# Patient Record
Sex: MALE | Race: WHITE | NOT HISPANIC OR LATINO | URBAN - NONMETROPOLITAN AREA
[De-identification: names, ages, dates, MRNs, and addresses within clinical notes are randomized per-mention and may not be internally consistent; named-entity substitution may affect disease eponyms.]

---

## 2024-04-12 ENCOUNTER — APPOINTMENT (OUTPATIENT)
Dept: CT IMAGING | Facility: HOSPITAL | Age: 29
End: 2024-04-12
Payer: OTHER GOVERNMENT

## 2024-04-12 ENCOUNTER — APPOINTMENT (OUTPATIENT)
Dept: GENERAL RADIOLOGY | Facility: HOSPITAL | Age: 29
End: 2024-04-12
Payer: OTHER GOVERNMENT

## 2024-04-12 ENCOUNTER — HOSPITAL ENCOUNTER (EMERGENCY)
Facility: HOSPITAL | Age: 29
Discharge: HOME | End: 2024-04-12
Attending: EMERGENCY MEDICINE
Payer: OTHER GOVERNMENT

## 2024-04-12 VITALS
SYSTOLIC BLOOD PRESSURE: 116 MMHG | WEIGHT: 150 LBS | HEIGHT: 68 IN | RESPIRATION RATE: 16 BRPM | TEMPERATURE: 98.3 F | BODY MASS INDEX: 22.73 KG/M2 | DIASTOLIC BLOOD PRESSURE: 87 MMHG | HEART RATE: 74 BPM | OXYGEN SATURATION: 98 %

## 2024-04-12 DIAGNOSIS — H93.8X3 EAR PRESSURE, BILATERAL: ICD-10-CM

## 2024-04-12 DIAGNOSIS — R07.89 CHEST TIGHTNESS: Primary | ICD-10-CM

## 2024-04-12 LAB
ALBUMIN SERPL-MCNC: 4.5 G/DL (ref 3.5–5.2)
ALBUMIN/GLOB SERPL: 1.6 G/DL
ALP SERPL-CCNC: 78 U/L (ref 39–117)
ALT SERPL W P-5'-P-CCNC: 135 U/L (ref 1–41)
ANION GAP SERPL CALCULATED.3IONS-SCNC: 11 MMOL/L (ref 5–15)
AST SERPL-CCNC: 45 U/L (ref 1–40)
BASOPHILS # BLD AUTO: 0.06 10*3/MM3 (ref 0–0.2)
BASOPHILS NFR BLD AUTO: 0.5 % (ref 0–1.5)
BILIRUB SERPL-MCNC: 0.4 MG/DL (ref 0–1.2)
BILIRUB UR QL STRIP: NEGATIVE
BUN SERPL-MCNC: 19 MG/DL (ref 6–20)
BUN/CREAT SERPL: 24.1 (ref 7–25)
CALCIUM SPEC-SCNC: 9.9 MG/DL (ref 8.6–10.5)
CHLORIDE SERPL-SCNC: 96 MMOL/L (ref 98–107)
CLARITY UR: CLEAR
CO2 SERPL-SCNC: 31 MMOL/L (ref 22–29)
COLOR UR: YELLOW
CREAT SERPL-MCNC: 0.79 MG/DL (ref 0.76–1.27)
DEPRECATED RDW RBC AUTO: 41.6 FL (ref 37–54)
EGFRCR SERPLBLD CKD-EPI 2021: 124.9 ML/MIN/1.73
EOSINOPHIL # BLD AUTO: 0.39 10*3/MM3 (ref 0–0.4)
EOSINOPHIL NFR BLD AUTO: 2.9 % (ref 0.3–6.2)
ERYTHROCYTE [DISTWIDTH] IN BLOOD BY AUTOMATED COUNT: 13.6 % (ref 12.3–15.4)
FLUAV SUBTYP SPEC NAA+PROBE: NOT DETECTED
FLUBV RNA ISLT QL NAA+PROBE: NOT DETECTED
GLOBULIN UR ELPH-MCNC: 2.9 GM/DL
GLUCOSE SERPL-MCNC: 81 MG/DL (ref 65–99)
GLUCOSE UR STRIP-MCNC: NEGATIVE MG/DL
HCT VFR BLD AUTO: 45.1 % (ref 37.5–51)
HETEROPH AB SER QL LA: NEGATIVE
HGB BLD-MCNC: 15.5 G/DL (ref 13–17.7)
HGB UR QL STRIP.AUTO: NEGATIVE
HOLD SPECIMEN: NORMAL
HOLD SPECIMEN: NORMAL
IMM GRANULOCYTES # BLD AUTO: 0.48 10*3/MM3 (ref 0–0.05)
IMM GRANULOCYTES NFR BLD AUTO: 3.6 % (ref 0–0.5)
KETONES UR QL STRIP: NEGATIVE
LEUKOCYTE ESTERASE UR QL STRIP.AUTO: NEGATIVE
LIPASE SERPL-CCNC: 51 U/L (ref 13–60)
LYMPHOCYTES # BLD AUTO: 5.53 10*3/MM3 (ref 0.7–3.1)
LYMPHOCYTES NFR BLD AUTO: 41.6 % (ref 19.6–45.3)
MAGNESIUM SERPL-MCNC: 2.6 MG/DL (ref 1.6–2.6)
MCH RBC QN AUTO: 28.9 PG (ref 26.6–33)
MCHC RBC AUTO-ENTMCNC: 34.4 G/DL (ref 31.5–35.7)
MCV RBC AUTO: 84.1 FL (ref 79–97)
MONOCYTES # BLD AUTO: 1.28 10*3/MM3 (ref 0.1–0.9)
MONOCYTES NFR BLD AUTO: 9.6 % (ref 5–12)
NEUTROPHILS NFR BLD AUTO: 41.8 % (ref 42.7–76)
NEUTROPHILS NFR BLD AUTO: 5.54 10*3/MM3 (ref 1.7–7)
NITRITE UR QL STRIP: NEGATIVE
NRBC BLD AUTO-RTO: 0 /100 WBC (ref 0–0.2)
PH UR STRIP.AUTO: 7.5 [PH] (ref 5–8)
PLAT MORPH BLD: NORMAL
PLATELET # BLD AUTO: 293 10*3/MM3 (ref 140–450)
PMV BLD AUTO: 9.3 FL (ref 6–12)
POTASSIUM SERPL-SCNC: 4 MMOL/L (ref 3.5–5.2)
PROT SERPL-MCNC: 7.4 G/DL (ref 6–8.5)
PROT UR QL STRIP: NEGATIVE
RBC # BLD AUTO: 5.36 10*6/MM3 (ref 4.14–5.8)
RBC MORPH BLD: NORMAL
S PYO AG THROAT QL: NEGATIVE
SARS-COV-2 RNA RESP QL NAA+PROBE: NOT DETECTED
SODIUM SERPL-SCNC: 138 MMOL/L (ref 136–145)
SP GR UR STRIP: 1.01 (ref 1–1.03)
TROPONIN T SERPL HS-MCNC: <6 NG/L
UROBILINOGEN UR QL STRIP: NORMAL
WBC MORPH BLD: NORMAL
WBC NRBC COR # BLD AUTO: 13.28 10*3/MM3 (ref 3.4–10.8)
WHOLE BLOOD HOLD COAG: NORMAL
WHOLE BLOOD HOLD SPECIMEN: NORMAL

## 2024-04-12 PROCEDURE — 83690 ASSAY OF LIPASE: CPT | Performed by: PHYSICIAN ASSISTANT

## 2024-04-12 PROCEDURE — 25010000002 ONDANSETRON PER 1 MG: Performed by: PHYSICIAN ASSISTANT

## 2024-04-12 PROCEDURE — 85025 COMPLETE CBC W/AUTO DIFF WBC: CPT | Performed by: PHYSICIAN ASSISTANT

## 2024-04-12 PROCEDURE — 84484 ASSAY OF TROPONIN QUANT: CPT | Performed by: PHYSICIAN ASSISTANT

## 2024-04-12 PROCEDURE — 86308 HETEROPHILE ANTIBODY SCREEN: CPT | Performed by: PHYSICIAN ASSISTANT

## 2024-04-12 PROCEDURE — 71045 X-RAY EXAM CHEST 1 VIEW: CPT

## 2024-04-12 PROCEDURE — 87636 SARSCOV2 & INF A&B AMP PRB: CPT | Performed by: EMERGENCY MEDICINE

## 2024-04-12 PROCEDURE — 25010000002 KETOROLAC TROMETHAMINE PER 15 MG: Performed by: PHYSICIAN ASSISTANT

## 2024-04-12 PROCEDURE — 74177 CT ABD & PELVIS W/CONTRAST: CPT

## 2024-04-12 PROCEDURE — 96374 THER/PROPH/DIAG INJ IV PUSH: CPT

## 2024-04-12 PROCEDURE — 80053 COMPREHEN METABOLIC PANEL: CPT | Performed by: PHYSICIAN ASSISTANT

## 2024-04-12 PROCEDURE — 83735 ASSAY OF MAGNESIUM: CPT | Performed by: PHYSICIAN ASSISTANT

## 2024-04-12 PROCEDURE — 25510000001 IOPAMIDOL 61 % SOLUTION: Performed by: EMERGENCY MEDICINE

## 2024-04-12 PROCEDURE — 96375 TX/PRO/DX INJ NEW DRUG ADDON: CPT

## 2024-04-12 PROCEDURE — 71260 CT THORAX DX C+: CPT

## 2024-04-12 PROCEDURE — 99285 EMERGENCY DEPT VISIT HI MDM: CPT

## 2024-04-12 PROCEDURE — 87040 BLOOD CULTURE FOR BACTERIA: CPT | Performed by: EMERGENCY MEDICINE

## 2024-04-12 PROCEDURE — 93005 ELECTROCARDIOGRAM TRACING: CPT | Performed by: PHYSICIAN ASSISTANT

## 2024-04-12 PROCEDURE — 36415 COLL VENOUS BLD VENIPUNCTURE: CPT

## 2024-04-12 PROCEDURE — 85007 BL SMEAR W/DIFF WBC COUNT: CPT | Performed by: PHYSICIAN ASSISTANT

## 2024-04-12 PROCEDURE — 87081 CULTURE SCREEN ONLY: CPT | Performed by: PHYSICIAN ASSISTANT

## 2024-04-12 PROCEDURE — 81003 URINALYSIS AUTO W/O SCOPE: CPT | Performed by: PHYSICIAN ASSISTANT

## 2024-04-12 PROCEDURE — 87880 STREP A ASSAY W/OPTIC: CPT | Performed by: PHYSICIAN ASSISTANT

## 2024-04-12 RX ORDER — SODIUM CHLORIDE 0.9 % (FLUSH) 0.9 %
10 SYRINGE (ML) INJECTION AS NEEDED
Status: DISCONTINUED | OUTPATIENT
Start: 2024-04-12 | End: 2024-04-12 | Stop reason: HOSPADM

## 2024-04-12 RX ORDER — ONDANSETRON 2 MG/ML
4 INJECTION INTRAMUSCULAR; INTRAVENOUS ONCE
Status: COMPLETED | OUTPATIENT
Start: 2024-04-12 | End: 2024-04-12

## 2024-04-12 RX ORDER — FLUTICASONE PROPIONATE 50 MCG
2 SPRAY, SUSPENSION (ML) NASAL DAILY
Qty: 15.8 ML | Refills: 0 | Status: SHIPPED | OUTPATIENT
Start: 2024-04-12

## 2024-04-12 RX ORDER — KETOROLAC TROMETHAMINE 30 MG/ML
30 INJECTION, SOLUTION INTRAMUSCULAR; INTRAVENOUS ONCE
Status: COMPLETED | OUTPATIENT
Start: 2024-04-12 | End: 2024-04-12

## 2024-04-12 RX ADMIN — IOPAMIDOL 100 ML: 612 INJECTION, SOLUTION INTRAVENOUS at 13:35

## 2024-04-12 RX ADMIN — ONDANSETRON 4 MG: 2 INJECTION INTRAMUSCULAR; INTRAVENOUS at 11:44

## 2024-04-12 RX ADMIN — KETOROLAC TROMETHAMINE 30 MG: 30 INJECTION, SOLUTION INTRAMUSCULAR; INTRAVENOUS at 13:51

## 2024-04-12 NOTE — ED PROVIDER NOTES
Subjective  History of Present Illness:    Chief Complaint:   Chief Complaint   Patient presents with    Flu Symptoms     Pt states he was sick the other day, flu like with burning chest. Pot is feeling better, meds are finished. Pt is now having is having abd pressure, Nausea, hiram ear pain with possible chest pain.weird feeling in the mouth, unable to concentrate.        History of Present Illness: Graham Sanchez is a 28 y.o. male who presents to the emergency department complaining of multiple complaints.  He states over a week ago started with flulike symptoms was seen at urgent treatment and given steroids and antibiotics, has finished these therapies.  States he continues to feel poorly has pressure in his ears, feels, foggy headed, has a cough, complains of burning in his chest and pressure in his chest and his epigastrium.  Vomiting or diarrhea.  Onset: Over a week ago  Duration: Ongoing  Exacerbating / Alleviating factors: None  Associated symptoms: headache      Nurses Notes reviewed and agree, including vitals, allergies, social history and prior medical history.     Review of Systems   HENT:          Bilateral ear pressure   Cardiovascular:         Tightness in chest   Gastrointestinal:         Tenderness in epigastrium   Musculoskeletal:  Negative for neck pain.   Neurological:  Negative for headaches.        Foggy headedness       Past Medical History:   Diagnosis Date    Back pain     Bipolar 1 disorder     Hypertension     Injury of back     from  service    Sciatica        Allergies:    Patient has no known allergies.      Past Surgical History:   Procedure Laterality Date    DENTAL PROCEDURE      SHOULDER SURGERY Left     TONSILLECTOMY           Social History     Socioeconomic History    Marital status:    Tobacco Use    Smoking status: Never    Smokeless tobacco: Never   Vaping Use    Vaping status: Never Used   Substance and Sexual Activity    Alcohol use: Yes     Comment: rarely  "   Drug use: Never         History reviewed. No pertinent family history.    Objective  Physical Exam:  /87   Pulse 74   Temp 98.3 °F (36.8 °C) (Oral)   Resp 16   Ht 172.7 cm (68\")   Wt 68 kg (150 lb)   SpO2 98%   BMI 22.81 kg/m²      Physical Exam  Vitals and nursing note reviewed.   Constitutional:       General: He is not in acute distress.     Appearance: Normal appearance. He is normal weight. He is not ill-appearing, toxic-appearing or diaphoretic.   HENT:      Head: Normocephalic and atraumatic.      Ears:      Comments: TMs normal bilaterally, there is evidence of fluid behind the TMs bilaterally without purulence bilaterally.  No evidence of mastoiditis.     Nose: Nose normal.      Mouth/Throat:      Mouth: Mucous membranes are moist.      Pharynx: Oropharynx is clear. No oropharyngeal exudate or posterior oropharyngeal erythema.   Eyes:      Extraocular Movements: Extraocular movements intact.   Neck:      Comments: 1 small cervical lymph node on the left which patient states is chronic and has been there \"for my whole life.\"  Cardiovascular:      Rate and Rhythm: Normal rate and regular rhythm.      Heart sounds: Normal heart sounds.   Pulmonary:      Effort: Pulmonary effort is normal.      Breath sounds: Normal breath sounds.   Abdominal:      General: Abdomen is flat.      Palpations: Abdomen is soft.      Tenderness: There is no abdominal tenderness. There is no guarding or rebound.   Musculoskeletal:         General: Normal range of motion.      Cervical back: Normal range of motion. No rigidity.   Skin:     General: Skin is warm and dry.   Neurological:      General: No focal deficit present.      Mental Status: He is alert and oriented to person, place, and time.      GCS: GCS eye subscore is 4. GCS verbal subscore is 5. GCS motor subscore is 6.      Cranial Nerves: Cranial nerves 2-12 are intact.      Motor: Motor function is intact.   Psychiatric:         Mood and Affect: Mood " normal.         Behavior: Behavior normal.           Procedures    ED Course:    ED Course as of 04/12/24 1452   Fri Apr 12, 2024   1210 COVID19: Not Detected [TM]   1210 Influenza A PCR: Not Detected [TM]   1210 Influenza B PCR: Not Detected [TM]   1342 Monospot: Negative [TM]   1411 WBC(!): 13.28 [TM]      ED Course User Index  [TM] Joel Tang PA-C       Lab Results (last 24 hours)       Procedure Component Value Units Date/Time    COVID PRE-OP / PRE-PROCEDURE SCREENING ORDER (NO ISOLATION) - Swab, Nasopharynx [[id number]]  (Normal) Collected: 04/12/24 1123    Specimen: Swab from Nasopharynx Updated: 04/12/24 1200    Narrative:      The following orders were created for panel order COVID PRE-OP / PRE-PROCEDURE SCREENING ORDER (NO ISOLATION) - Swab, Nasopharynx.  Procedure                               Abnormality         Status                     ---------                               -----------         ------                     COVID-19 and FLU A/B PCR...[[id number]]  Normal              Final result                 Please view results for these tests on the individual orders.    COVID-19 and FLU A/B PCR, 1 HR TAT - Swab, Nasopharynx [[id number]]  (Normal) Collected: 04/12/24 1123    Specimen: Swab from Nasopharynx Updated: 04/12/24 1200     COVID19 Not Detected     Influenza A PCR Not Detected     Influenza B PCR Not Detected    Narrative:      Fact sheet for providers: https://www.fda.gov/media/661354/download    Fact sheet for patients: https://www.fda.gov/media/852758/download    Test performed by PCR.    CBC & Differential [[id number]]  (Abnormal) Collected: 04/12/24 1123    Specimen: Blood Updated: 04/12/24 1206    Narrative:      The following orders were created for panel order CBC & Differential.  Procedure                               Abnormality         Status                     ---------                               -----------         ------                     CBC Auto  Differential[[id number]]        Abnormal            Final result               Scan Slide[[id number]]                   Normal              Final result                 Please view results for these tests on the individual orders.    Comprehensive Metabolic Panel [[id number]]  (Abnormal) Collected: 04/12/24 1123    Specimen: Blood Updated: 04/12/24 1152     Glucose 81 mg/dL      BUN 19 mg/dL      Creatinine 0.79 mg/dL      Sodium 138 mmol/L      Potassium 4.0 mmol/L      Chloride 96 mmol/L      CO2 31.0 mmol/L      Calcium 9.9 mg/dL      Total Protein 7.4 g/dL      Albumin 4.5 g/dL      ALT (SGPT) 135 U/L      AST (SGOT) 45 U/L      Alkaline Phosphatase 78 U/L      Total Bilirubin 0.4 mg/dL      Globulin 2.9 gm/dL      A/G Ratio 1.6 g/dL      BUN/Creatinine Ratio 24.1     Anion Gap 11.0 mmol/L      eGFR 124.9 mL/min/1.73     Narrative:      GFR Normal >60  Chronic Kidney Disease <60  Kidney Failure <15      Single High Sensitivity Troponin T [[id number]]  (Normal) Collected: 04/12/24 1123    Specimen: Blood Updated: 04/12/24 1156     HS Troponin T <6 ng/L     Narrative:      High Sensitive Troponin T Reference Range:  <14.0 ng/L- Negative Female for AMI  <22.0 ng/L- Negative Male for AMI  >=14 - Abnormal Female indicating possible myocardial injury.  >=22 - Abnormal Male indicating possible myocardial injury.   Clinicians would have to utilize clinical acumen, EKG, Troponin, and serial changes to determine if it is an Acute Myocardial Infarction or myocardial injury due to an underlying chronic condition.         Magnesium [[id number]]  (Normal) Collected: 04/12/24 1123    Specimen: Blood Updated: 04/12/24 1152     Magnesium 2.6 mg/dL     Lipase [[id number]]  (Normal) Collected: 04/12/24 1123    Specimen: Blood Updated: 04/12/24 1202     Lipase 51 U/L     CBC Auto Differential [[id number]]  (Abnormal) Collected: 04/12/24 1123    Specimen: Blood Updated: 04/12/24 1206     WBC 13.28 10*3/mm3      RBC 5.36 10*6/mm3       Hemoglobin 15.5 g/dL      Hematocrit 45.1 %      MCV 84.1 fL      MCH 28.9 pg      MCHC 34.4 g/dL      RDW 13.6 %      RDW-SD 41.6 fl      MPV 9.3 fL      Platelets 293 10*3/mm3      Neutrophil % 41.8 %      Lymphocyte % 41.6 %      Monocyte % 9.6 %      Eosinophil % 2.9 %      Basophil % 0.5 %      Immature Grans % 3.6 %      Neutrophils, Absolute 5.54 10*3/mm3      Lymphocytes, Absolute 5.53 10*3/mm3      Monocytes, Absolute 1.28 10*3/mm3      Eosinophils, Absolute 0.39 10*3/mm3      Basophils, Absolute 0.06 10*3/mm3      Immature Grans, Absolute 0.48 10*3/mm3      nRBC 0.0 /100 WBC     Scan Slide [[id number]]  (Normal) Collected: 04/12/24 1123    Specimen: Blood Updated: 04/12/24 1206     RBC Morphology Normal     WBC Morphology Normal     Platelet Morphology Normal    Mononucleosis Screen [[id number]]  (Normal) Collected: 04/12/24 1123    Specimen: Blood Updated: 04/12/24 1338     Monospot Negative    Urinalysis With Culture If Indicated - Urine, Clean Catch [[id number]]  (Normal) Collected: 04/12/24 1220    Specimen: Urine, Clean Catch Updated: 04/12/24 1234     Color, UA Yellow     Appearance, UA Clear     pH, UA 7.5     Specific Gravity, UA 1.010     Glucose, UA Negative     Ketones, UA Negative     Bilirubin, UA Negative     Blood, UA Negative     Protein, UA Negative     Leuk Esterase, UA Negative     Nitrite, UA Negative     Urobilinogen, UA 0.2 E.U./dL    Narrative:      In absence of clinical symptoms, the presence of pyuria, bacteria, and/or nitrites on the urinalysis result does not correlate with infection.  Urine microscopic not indicated.    Rapid Strep A Screen - Swab, Throat [[id number]]  (Normal) Collected: 04/12/24 1352    Specimen: Swab from Throat Updated: 04/12/24 1402     Strep A Ag Negative    Beta Strep Culture, Throat - Swab, Throat [[id number]] Collected: 04/12/24 1352    Specimen: Swab from Throat Updated: 04/12/24 1402    Blood Culture With ALON - Blood, Arm, Left [[id number]] Collected:  04/12/24 1440    Specimen: Blood from Arm, Left Updated: 04/12/24 1440    Blood Culture With ALON - Blood, Hand, Right [[id number]] Collected: 04/12/24 1440    Specimen: Blood from Hand, Right Updated: 04/12/24 1440             CT Chest With Contrast Diagnostic    Result Date: 4/12/2024  PROCEDURE: CT ABDOMEN PELVIS W CONTRAST-, CT CHEST W CONTRAST DIAGNOSTIC-  HISTORY: upper abdominal pain, chest tightness  COMPARISON: None .  PROCEDURE: Axial images were obtained from the thoracic inlet through the pubic symphysis following the administration of Isovue 300 contrast.   FINDINGS:  CHEST: There is no evidence of axillary adenopathy. Some material of intermediate attenuation is seen in the anterior mediastinum likely due to residual thymic tissue. Heart size is normal. There are no pleural or pericardial effusions. Lung windows reveal no focal opacities or suspicious pulmonary nodules. There is no pneumothorax. Bone windows reveal no lytic or destructive lesions.  ABDOMEN: The liver is homogeneous in appearance with no focal lesions. The gallbladder is present. There are no CT visualized gallstones. The spleen is unremarkable. No adrenal mass is present.  The pancreas is normal. The kidneys are unremarkable. The aorta is normal in caliber. There is no free fluid or adenopathy. No oral contrast was given. There is questionable wall thickening of the stomach. The abdominal portions of the remaining GI tract are unremarkable. There is no evidence of bowel obstruction.  PELVIS: The appendix is not definitely identified. There are no secondary signs to suggest appendicitis. The urinary bladder is unremarkable. There are several right lower quadrant mesenteric lymph nodes, consider mesenteric adenitis. Bone windows reveal no lytic or destructive lesions.      Impression: 1. Questionable wall thickening of the stomach. 2. Possible right lower quadrant mesenteric adenitis.  This study was performed with techniques to keep  radiation doses as low as reasonably achievable (ALARA). Individualized dose reduction techniques using automated exposure control or adjustment of mA and/or kV according to the patient size were employed.    CTDI: 3.73 mGy DLP: 262.34 mGy.cm   Images were reviewed, interpreted, and dictated by Dr. Vicenta White MD Transcribed by Idalia Nam PA-C.  This report was signed and finalized on 4/12/2024 2:07 PM by Vicenta White MD.      CT Abdomen Pelvis With Contrast    Result Date: 4/12/2024  PROCEDURE: CT ABDOMEN PELVIS W CONTRAST-, CT CHEST W CONTRAST DIAGNOSTIC-  HISTORY: upper abdominal pain, chest tightness  COMPARISON: None .  PROCEDURE: Axial images were obtained from the thoracic inlet through the pubic symphysis following the administration of Isovue 300 contrast.   FINDINGS:  CHEST: There is no evidence of axillary adenopathy. Some material of intermediate attenuation is seen in the anterior mediastinum likely due to residual thymic tissue. Heart size is normal. There are no pleural or pericardial effusions. Lung windows reveal no focal opacities or suspicious pulmonary nodules. There is no pneumothorax. Bone windows reveal no lytic or destructive lesions.  ABDOMEN: The liver is homogeneous in appearance with no focal lesions. The gallbladder is present. There are no CT visualized gallstones. The spleen is unremarkable. No adrenal mass is present.  The pancreas is normal. The kidneys are unremarkable. The aorta is normal in caliber. There is no free fluid or adenopathy. No oral contrast was given. There is questionable wall thickening of the stomach. The abdominal portions of the remaining GI tract are unremarkable. There is no evidence of bowel obstruction.  PELVIS: The appendix is not definitely identified. There are no secondary signs to suggest appendicitis. The urinary bladder is unremarkable. There are several right lower quadrant mesenteric lymph nodes, consider mesenteric adenitis. Bone windows  reveal no lytic or destructive lesions.      Impression: 1. Questionable wall thickening of the stomach. 2. Possible right lower quadrant mesenteric adenitis.  This study was performed with techniques to keep radiation doses as low as reasonably achievable (ALARA). Individualized dose reduction techniques using automated exposure control or adjustment of mA and/or kV according to the patient size were employed.    CTDI: 3.73 mGy DLP: 262.34 mGy.cm   Images were reviewed, interpreted, and dictated by Dr. Vicenta White MD Transcribed by Idalia Nam PA-C.  This report was signed and finalized on 4/12/2024 2:07 PM by Vicenta White MD.      XR Chest 1 View    Result Date: 4/12/2024  PROCEDURE: XR CHEST 1 VW-  HISTORY: chest pressure, mouth and hand numbness.  COMPARISON: None.  FINDINGS: The heart is normal in size. The lungs are clear. The mediastinum is unremarkable. There is no pneumothorax.  There are no acute osseous abnormalities.      Impression: No acute cardiopulmonary process.      This report was signed and finalized on 4/12/2024 12:17 PM by Vicenta White MD.                             Medical Decision Making  Problems Addressed:  Chest tightness: complicated acute illness or injury  Ear pressure, bilateral: complicated acute illness or injury    Amount and/or Complexity of Data Reviewed  Labs: ordered. Decision-making details documented in ED Course.  Radiology: ordered.  ECG/medicine tests: ordered.    Risk  Prescription drug management.              Graham Sanchez is a 28 y.o. male who presents to the emergency department for evaluation of multiple complaints including chest tightness, abdominal tightness, feeling disoriented, ear pressure.    Differential diagnosis includes COVID, flu, strep, mono, viral illness, pneumonia, intra-abdominal process, among other etiologies.    CBC, CMP, magnesium, troponin, COVID and flu swab, lipase, urinalysis, strep swab, monotest, blood cultures x 2 ordered for  further evaluation of the patient's presentation.    Chart review if available included outside testing, previous visits, prior labs, prior imaging, available notes from prior evaluations or visits with specialists, medication list, allergies, past medical history, past surgical history when applicable.    Patient was treated with   Medications   sodium chloride 0.9 % flush 10 mL (has no administration in time range)   sodium chloride 0.9 % flush 10 mL (has no administration in time range)   ondansetron (ZOFRAN) injection 4 mg (4 mg Intravenous Given 4/12/24 1144)   ketorolac (TORADOL) injection 30 mg (30 mg Intravenous Given 4/12/24 1351)   iopamidol (ISOVUE-300) 61 % injection 100 mL (100 mL Intravenous Given 4/12/24 1335)       Patient states feeling better than when he came in.  Hemodynamically stable.  Workup reveals very mild leukocytosis suspect likely due to the prednisone he took outpatient recently.  Just finished amoxicillin.  No meningeal signs, able to touch chin to chest and rotate head and look at the ceiling without any neck pain or stiffness.  Complains of pain and pressure deep within his ears, he does have evidence of fluid behind both TMs, will give Flonase.  Strep and mono testing negative, no signs of significant electrolyte abnormality, mild AST and ALT elevation but no right upper quadrant tenderness.  Will obtain blood cultures as a precaution but I feel he is safe for discharge home with symptomatic treatment.  He does state he has a mild left sided headache now.  He is awake alert and oriented no acute distress.  Case labs management discussed with Dr. Hernández.    Given left-sided headache CT scan of the head was ordered.  After reassessing the patient now at 1445 hrs. he states headache is now improving he believes the Toradol is helping.  Did recommend still to obtain head CT given the fact he has had a headache and felt disoriented but he declined.  He is awake alert oriented appears  nontoxic, seems to be feeling much better and looks much more comfortable than when he arrived.  He states he will come back if he gets worse he states he is feeling significantly better than when he came in and is happy to be discharged home at this time.    Plan for disposition is discharged home.  Patient/family comfortable with and understanding of the plan.      Final diagnoses:   Chest tightness   Ear pressure, bilateral          Joel Tang PA-C  04/12/24 1449       Joel Tang PA-C  04/12/24 1458

## 2024-04-14 LAB — BACTERIA SPEC AEROBE CULT: NORMAL

## 2024-04-17 LAB
BACTERIA SPEC AEROBE CULT: NORMAL
BACTERIA SPEC AEROBE CULT: NORMAL